# Patient Record
Sex: FEMALE | Race: BLACK OR AFRICAN AMERICAN | NOT HISPANIC OR LATINO
[De-identification: names, ages, dates, MRNs, and addresses within clinical notes are randomized per-mention and may not be internally consistent; named-entity substitution may affect disease eponyms.]

---

## 2017-01-13 ENCOUNTER — APPOINTMENT (OUTPATIENT)
Dept: OBGYN | Facility: CLINIC | Age: 52
End: 2017-01-13

## 2017-07-07 ENCOUNTER — APPOINTMENT (OUTPATIENT)
Dept: OBGYN | Facility: CLINIC | Age: 52
End: 2017-07-07

## 2017-07-07 ENCOUNTER — OUTPATIENT (OUTPATIENT)
Dept: OUTPATIENT SERVICES | Facility: HOSPITAL | Age: 52
LOS: 1 days | Discharge: HOME | End: 2017-07-07

## 2017-07-07 ENCOUNTER — RESULT REVIEW (OUTPATIENT)
Age: 52
End: 2017-07-07

## 2017-07-07 VITALS
SYSTOLIC BLOOD PRESSURE: 120 MMHG | DIASTOLIC BLOOD PRESSURE: 70 MMHG | WEIGHT: 194 LBS | BODY MASS INDEX: 32.32 KG/M2 | HEIGHT: 65 IN

## 2017-07-10 ENCOUNTER — RESULT REVIEW (OUTPATIENT)
Age: 52
End: 2017-07-10

## 2017-07-11 LAB
BASOPHILS # BLD: 0.02 TH/MM3
BASOPHILS NFR BLD: 0.2 %
DIFFERENTIAL METHOD BLD: NORMAL
EOSINOPHIL # BLD: 0.05 TH/MM3
EOSINOPHIL NFR BLD: 0.6 %
ERYTHROCYTE [DISTWIDTH] IN BLOOD BY AUTOMATED COUNT: 14.7 %
ESTRADIOL FREE SERPL-MCNC: 7 PG/ML
FSH SERPL-MCNC: 38.1 IU/L
GRANULOCYTES # BLD: 4.21 TH/MM3
GRANULOCYTES NFR BLD: 47.1 %
HCT VFR BLD AUTO: 41.1 %
HGB BLD-MCNC: 12.9 G/DL
HIV1+2 AB SPEC QL IA.RAPID: NONREACTIVE
IMM GRANULOCYTES # BLD: 0.01 TH/MM3
IMM GRANULOCYTES NFR BLD: 0.1 %
LYMPHOCYTES # BLD: 4.03 TH/MM3
LYMPHOCYTES NFR BLD: 45.1 %
MCH RBC QN AUTO: 27 PG
MCHC RBC AUTO-ENTMCNC: 31.4 G/DL
MCV RBC AUTO: 86.2 FL
MONOCYTES # BLD: 0.62 TH/MM3
MONOCYTES NFR BLD: 6.9 %
PLATELET # BLD: 296 TH/MM3
PMV BLD AUTO: 11.4 FL
PROLACTIN SERPL-MCNC: 8.2 NG/ML
RBC # BLD AUTO: 4.77 MIL/MM3
T4 FREE SERPL-MCNC: 1.3 NG/DL
TSH SERPL DL<=0.005 MIU/L-ACNC: 1.12 UIU/ML
WBC # BLD: 8.94 TH/MM3

## 2017-07-14 ENCOUNTER — APPOINTMENT (OUTPATIENT)
Dept: OBGYN | Facility: CLINIC | Age: 52
End: 2017-07-14

## 2017-07-14 DIAGNOSIS — N95.0 POSTMENOPAUSAL BLEEDING: ICD-10-CM

## 2017-07-14 LAB — HPV I/H RISK 1 DNA CVX QL PROBE+SIG AMP: NOT DETECTED

## 2017-07-18 ENCOUNTER — APPOINTMENT (OUTPATIENT)
Dept: ANTEPARTUM | Facility: CLINIC | Age: 52
End: 2017-07-18

## 2017-07-19 DIAGNOSIS — D25.9 LEIOMYOMA OF UTERUS, UNSPECIFIED: ICD-10-CM

## 2017-07-19 DIAGNOSIS — N83.209 UNSPECIFIED OVARIAN CYST, UNSPECIFIED SIDE: ICD-10-CM

## 2017-07-21 ENCOUNTER — OUTPATIENT (OUTPATIENT)
Dept: OUTPATIENT SERVICES | Facility: HOSPITAL | Age: 52
LOS: 1 days | Discharge: HOME | End: 2017-07-21

## 2017-07-21 ENCOUNTER — APPOINTMENT (OUTPATIENT)
Dept: OBGYN | Facility: CLINIC | Age: 52
End: 2017-07-21

## 2017-07-21 VITALS
BODY MASS INDEX: 32.32 KG/M2 | DIASTOLIC BLOOD PRESSURE: 60 MMHG | SYSTOLIC BLOOD PRESSURE: 120 MMHG | HEIGHT: 65 IN | WEIGHT: 194 LBS

## 2017-07-28 DIAGNOSIS — N95.0 POSTMENOPAUSAL BLEEDING: ICD-10-CM

## 2017-07-29 LAB
C TRACH RRNA SPEC QL NAA+PROBE: NOT DETECTED
HBV SURFACE AG SER-ACNC: NONREACTIVE
HCV AB S/CO SERPL IA: 0.12 S/CO
HCV AB SER QL: NONREACTIVE
N GONORRHOEA RRNA SPEC QL NAA+PROBE: NOT DETECTED
T PALLIDUM AB SER QL: NEGATIVE
T PALLIDUM AB SER-ACNC: <0.1 INDEX

## 2018-01-10 ENCOUNTER — APPOINTMENT (OUTPATIENT)
Dept: ANTEPARTUM | Facility: CLINIC | Age: 53
End: 2018-01-10

## 2018-10-02 ENCOUNTER — TRANSCRIPTION ENCOUNTER (OUTPATIENT)
Age: 53
End: 2018-10-02

## 2018-11-26 ENCOUNTER — OUTPATIENT (OUTPATIENT)
Dept: OUTPATIENT SERVICES | Facility: HOSPITAL | Age: 53
LOS: 1 days | Discharge: HOME | End: 2018-11-26

## 2018-11-26 ENCOUNTER — APPOINTMENT (OUTPATIENT)
Dept: OBGYN | Facility: CLINIC | Age: 53
End: 2018-11-26

## 2018-11-26 VITALS
DIASTOLIC BLOOD PRESSURE: 82 MMHG | SYSTOLIC BLOOD PRESSURE: 118 MMHG | BODY MASS INDEX: 35.99 KG/M2 | HEIGHT: 65 IN | WEIGHT: 216 LBS

## 2018-11-26 DIAGNOSIS — Z87.42 PERSONAL HISTORY OF OTHER DISEASES OF THE FEMALE GENITAL TRACT: ICD-10-CM

## 2018-11-26 DIAGNOSIS — N64.4 MASTODYNIA: ICD-10-CM

## 2018-11-26 DIAGNOSIS — Z01.419 ENCOUNTER FOR GYNECOLOGICAL EXAMINATION (GENERAL) (ROUTINE) WITHOUT ABNORMAL FINDINGS: ICD-10-CM

## 2018-12-03 LAB
C TRACH RRNA SPEC QL NAA+PROBE: NOT DETECTED
N GONORRHOEA RRNA SPEC QL NAA+PROBE: NOT DETECTED
SOURCE AMPLIFICATION: NORMAL

## 2018-12-17 ENCOUNTER — APPOINTMENT (OUTPATIENT)
Dept: ANTEPARTUM | Facility: CLINIC | Age: 53
End: 2018-12-17

## 2020-02-20 ENCOUNTER — OUTPATIENT (OUTPATIENT)
Dept: OUTPATIENT SERVICES | Facility: HOSPITAL | Age: 55
LOS: 1 days | Discharge: HOME | End: 2020-02-20
Payer: COMMERCIAL

## 2020-02-20 DIAGNOSIS — R92.8 OTHER ABNORMAL AND INCONCLUSIVE FINDINGS ON DIAGNOSTIC IMAGING OF BREAST: ICD-10-CM

## 2020-02-20 PROCEDURE — G0279: CPT | Mod: 26

## 2020-02-20 PROCEDURE — 77066 DX MAMMO INCL CAD BI: CPT | Mod: 26

## 2020-02-20 PROCEDURE — 76642 ULTRASOUND BREAST LIMITED: CPT | Mod: 26,50

## 2020-11-23 ENCOUNTER — OUTPATIENT (OUTPATIENT)
Dept: OUTPATIENT SERVICES | Facility: HOSPITAL | Age: 55
LOS: 1 days | Discharge: HOME | End: 2020-11-23

## 2020-11-23 ENCOUNTER — APPOINTMENT (OUTPATIENT)
Dept: OBGYN | Facility: CLINIC | Age: 55
End: 2020-11-23
Payer: COMMERCIAL

## 2020-11-23 VITALS — SYSTOLIC BLOOD PRESSURE: 110 MMHG | BODY MASS INDEX: 35.94 KG/M2 | DIASTOLIC BLOOD PRESSURE: 80 MMHG | WEIGHT: 216 LBS

## 2020-11-23 DIAGNOSIS — Z01.419 ENCOUNTER FOR GYNECOLOGICAL EXAMINATION (GENERAL) (ROUTINE) WITHOUT ABNORMAL FINDINGS: ICD-10-CM

## 2020-11-23 DIAGNOSIS — D17.23 BENIGN LIPOMATOUS NEOPLASM OF SKIN AND SUBCUTANEOUS TISSUE OF RIGHT LEG: ICD-10-CM

## 2020-11-23 PROCEDURE — 99396 PREV VISIT EST AGE 40-64: CPT

## 2020-11-23 RX ORDER — METRONIDAZOLE 7.5 MG/G
0.75 GEL VAGINAL
Qty: 1 | Refills: 5 | Status: ACTIVE | COMMUNITY
Start: 2020-11-23 | End: 1900-01-01

## 2020-11-23 NOTE — HISTORY OF PRESENT ILLNESS
[Patient reported mammogram was normal] : Patient reported mammogram was normal [Patient reported PAP Smear was normal] : Patient reported PAP Smear was normal [TextBox_4] : Patient here for annual. C/O whitish discharge from vagina. She is also requesting STD testing as she has recently become sexually active again. Denies PMB. No period for 2 years. [Mammogramdate] : 2018 [PapSmeardate] : 2017

## 2020-11-23 NOTE — PHYSICAL EXAM
[Appropriately responsive] : appropriately responsive [Alert] : alert [No Acute Distress] : no acute distress [No Lymphadenopathy] : no lymphadenopathy [No Murmurs] : no murmurs [Soft] : soft [Non-tender] : non-tender [Non-distended] : non-distended [No HSM] : No HSM [No Lesions] : no lesions [No Mass] : no mass [Oriented x3] : oriented x3 [Examination Of The Breasts] : a normal appearance [No Masses] : no breast masses were palpable [Labia Majora] : normal [Labia Minora] : normal [Discharge] : a  ~M vaginal discharge was present [Normal] : normal [Enlarged ___ wks] : enlarged [unfilled] ~Uweeks [Uterine Adnexae] : normal

## 2020-11-29 LAB
A VAGINAE DNA VAG QL NAA+PROBE: NORMAL
BVAB2 DNA VAG QL NAA+PROBE: NORMAL
C KRUSEI DNA VAG QL NAA+PROBE: NEGATIVE
C TRACH RRNA SPEC QL NAA+PROBE: NEGATIVE
HPV HIGH+LOW RISK DNA PNL CVX: NOT DETECTED
MEGA1 DNA VAG QL NAA+PROBE: NORMAL
N GONORRHOEA RRNA SPEC QL NAA+PROBE: NEGATIVE
T VAGINALIS RRNA SPEC QL NAA+PROBE: NEGATIVE

## 2020-12-09 ENCOUNTER — APPOINTMENT (OUTPATIENT)
Dept: SURGERY | Facility: CLINIC | Age: 55
End: 2020-12-09
Payer: COMMERCIAL

## 2020-12-09 VITALS
SYSTOLIC BLOOD PRESSURE: 120 MMHG | HEART RATE: 81 BPM | HEIGHT: 65 IN | DIASTOLIC BLOOD PRESSURE: 80 MMHG | WEIGHT: 217 LBS | TEMPERATURE: 96 F | BODY MASS INDEX: 36.15 KG/M2

## 2020-12-09 PROCEDURE — 99203 OFFICE O/P NEW LOW 30 MIN: CPT

## 2020-12-09 PROCEDURE — 99072 ADDL SUPL MATRL&STAF TM PHE: CPT

## 2020-12-10 NOTE — PHYSICAL EXAM
[Normal Breath Sounds] : Normal breath sounds [Normal Heart Sounds] : normal heart sounds [Alert] : alert [Calm] : calm [de-identified] : no acute distress [de-identified] : full ROM x 4 [de-identified] : right medial thigh fatty fullness without discernible mass

## 2020-12-10 NOTE — HISTORY OF PRESENT ILLNESS
[de-identified] : 56yo female with PMHx/PSHx of abdominoplasty and liposuction presenting for evaluation of right medial thigh mass. Patient states that she noted lesion in the past year. She denies change in size, drainage, or neuropathy. Patient does not notice other lesions.

## 2020-12-10 NOTE — ASSESSMENT
[FreeTextEntry1] : 54yo female with right medial thigh fatty overgrowth, possible lipoma.\par -informed patient that there is not a clear mass, but lipoplasty is possible\par -patient would like to wait until March 2021 for intervention\par -will monitor region \par -call when read to prepare for surgery\par -will recommend surgery in [place] \par -will waive PAST

## 2021-06-01 ENCOUNTER — OUTPATIENT (OUTPATIENT)
Dept: OUTPATIENT SERVICES | Facility: HOSPITAL | Age: 56
LOS: 1 days | Discharge: HOME | End: 2021-06-01
Payer: COMMERCIAL

## 2021-06-01 ENCOUNTER — TRANSCRIPTION ENCOUNTER (OUTPATIENT)
Age: 56
End: 2021-06-01

## 2021-06-01 DIAGNOSIS — R07.89 OTHER CHEST PAIN: ICD-10-CM

## 2021-06-01 PROCEDURE — 71046 X-RAY EXAM CHEST 2 VIEWS: CPT | Mod: 26

## 2021-06-07 ENCOUNTER — APPOINTMENT (OUTPATIENT)
Dept: OBGYN | Facility: CLINIC | Age: 56
End: 2021-06-07
Payer: COMMERCIAL

## 2021-06-07 ENCOUNTER — OUTPATIENT (OUTPATIENT)
Dept: OUTPATIENT SERVICES | Facility: HOSPITAL | Age: 56
LOS: 1 days | Discharge: HOME | End: 2021-06-07

## 2021-06-07 VITALS — DIASTOLIC BLOOD PRESSURE: 78 MMHG | WEIGHT: 224 LBS | SYSTOLIC BLOOD PRESSURE: 122 MMHG | BODY MASS INDEX: 37.28 KG/M2

## 2021-06-07 DIAGNOSIS — M79.89 OTHER SPECIFIED SOFT TISSUE DISORDERS: ICD-10-CM

## 2021-06-07 DIAGNOSIS — R20.2 PARESTHESIA OF SKIN: ICD-10-CM

## 2021-06-07 PROCEDURE — 99214 OFFICE O/P EST MOD 30 MIN: CPT

## 2021-06-07 NOTE — PLAN
[FreeTextEntry1] : Left arm and axillary symptoms and findings are probably due to a combination of:\par 1) Spinal disease\par 2) Trauma from a fall\par 3) Possible Lipoma of the axilla\par 4) Possible side effect from Covid vaccination.\par \par I recommend that she take a short course of steroids as was recommended by urgent care.\par appointment with Neurology\par Appt with breast surgeon and breast/axillary sonogram\par patient reassured that the finding in the axilla is unlikely to be anything worrisome or cancerous. She may need to see a general surgeon following the breast surgeon.\par

## 2021-06-07 NOTE — PHYSICAL EXAM
[Soft] : soft [Non-tender] : non-tender [Non-distended] : non-distended [No HSM] : No HSM [No Lesions] : no lesions [No Mass] : no mass [FreeTextEntry2] : Left extremity (especially hand) is slightly more swollen than the right. [FreeTextEntry6] : The right axilla is swollen with no discrete lymph nodes palpitates. There seems to be a possible lipoma present. [FreeTextEntry8] : Both upper extremity reflex are normal. [Examination Of The Breasts] : a normal appearance [No Masses] : no breast masses were palpable [Labia Majora] : normal [Labia Minora] : normal [Normal] : normal [Uterine Adnexae] : normal

## 2021-06-07 NOTE — HISTORY OF PRESENT ILLNESS
[FreeTextEntry1] : Patient c/o left arm pain radiating up to the left axilla for the past several weeks.\par She received the Moderna vaccine in March. The second dose was April 17th.\par She also admits to falling in March and hurting her left arm.\par She went to an urgent care center and was told that the has lymph nodes in her left axilla and to get an immediate breast exam.\par PAtient also report some numbness and paraesthesias in her left hand and feels that her left hand is a bit more swollen than her right hand.\par \par \par \par

## 2021-06-15 ENCOUNTER — RESULT REVIEW (OUTPATIENT)
Age: 56
End: 2021-06-15

## 2021-06-15 ENCOUNTER — OUTPATIENT (OUTPATIENT)
Dept: OUTPATIENT SERVICES | Facility: HOSPITAL | Age: 56
LOS: 1 days | Discharge: HOME | End: 2021-06-15
Payer: COMMERCIAL

## 2021-06-15 DIAGNOSIS — N64.59 OTHER SIGNS AND SYMPTOMS IN BREAST: ICD-10-CM

## 2021-06-15 PROCEDURE — 77066 DX MAMMO INCL CAD BI: CPT | Mod: 26

## 2021-06-15 PROCEDURE — 76642 ULTRASOUND BREAST LIMITED: CPT | Mod: 26,LT

## 2021-06-15 PROCEDURE — G0279: CPT | Mod: 26

## 2021-10-27 NOTE — HISTORY OF PRESENT ILLNESS
[FreeTextEntry1] : Tanya is 56 year old female here with complaints of LEFT breast palpable mass\par \par \par Her imaging is as follows:\par  06/15/2021 b/l dx mammo and LEFT US\par -breasts are heterogeneously dense\par No mammographic or sonographic evidence of malignancy Deemed BIRADS2\par \par \par HISTORICAL RISK FACTORS\par -no previous sx or breast bx \par -fam hx of breast cancer in maternal aunt \par - age at first born\par -no gyn surgery \par \par \par

## 2021-10-27 NOTE — ASSESSMENT
[FreeTextEntry1] : Tanya is 56 year old female here with complaints of LEFT breast palpable mass\par \par \par Her imaging is as follows:\par  06/15/2021 b/l dx mammo and LEFT US\par -breasts are heterogeneously dense\par No mammographic or sonographic evidence of malignancy Deemed BIRADS2\par

## 2021-10-27 NOTE — DATA REVIEWED
[FreeTextEntry1] : EXAM:  MG US BREAST LIMITED LT\par EXAM:  MG MAMMO DIAG W CARTER BI#\par \par \par PROCEDURE DATE:  06/15/2021\par \par \par \par INTERPRETATION:  Clinical History / Reason for exam: Palpable concern left axilla. Screening right breast.\par \par The patient reports her last clinical breast examination was performed about one month ago.\par \par Family history of breast cancer: maternal aunt, at age 60, breast cancer.\par \par Comparisons: Mammograms dating back to 2018.\par \par Views obtained: bilateral full field digital 2D and digital tomosynthesis images as well as spot views.\par \par Computer-aided detection was utilized in the interpretation of this examination.\par \par Breast composition: The breasts are heterogeneously dense, which may obscure small masses.\par \par Findings:\par \par Mammogram:\par \par No suspicious mass, microcalcifications or areas of architectural distortion seen in either breast.\par \par Ultrasound:\par \par Targeted unilateral left breast ultrasound was performed.\par \par No suspicious solid or cystic mass at area of palpable concern in the left axilla.\par \par Impression: No mammographic or sonographic evidence of malignancy.\par \par Recommendation: Any decision to biopsy the palpable abnormality should be based on the level of clinical concern.\par \par BI-RADS Category 2: Benign\par

## 2021-10-28 ENCOUNTER — APPOINTMENT (OUTPATIENT)
Dept: BREAST CENTER | Facility: CLINIC | Age: 56
End: 2021-10-28

## 2022-07-07 ENCOUNTER — APPOINTMENT (OUTPATIENT)
Dept: PAIN MANAGEMENT | Facility: CLINIC | Age: 57
End: 2022-07-07

## 2022-07-07 VITALS
BODY MASS INDEX: 37.32 KG/M2 | DIASTOLIC BLOOD PRESSURE: 83 MMHG | WEIGHT: 224 LBS | HEIGHT: 65 IN | HEART RATE: 76 BPM | SYSTOLIC BLOOD PRESSURE: 119 MMHG

## 2022-07-07 DIAGNOSIS — M54.12 RADICULOPATHY, CERVICAL REGION: ICD-10-CM

## 2022-07-07 PROCEDURE — 99213 OFFICE O/P EST LOW 20 MIN: CPT

## 2022-07-08 NOTE — PHYSICAL EXAM
[de-identified] : GENERAL APPEARANCE OF PATIENT IS WELL DEVELOPED, WELL NOURISHED, BODY HABITUS NORMAL, WELL GROOMED, NO DEFORMITIES NOTED. \par Head - Atraumatic and Normocephalic \par Eyes, Nose, and Throat: External inspection of ears and nose are normal overall without scars, lesions, or masses noted. Assessment of hearing is normal\par Neck-Examination of neck shows no masses, overall appearance is normal, neck is symmetric, tracheal position is midline, no crepitus is noted. Examination of thyroid shows no enlargement, tenderness or masses\par Respiratory- Assessment of respiratory effort shows no intercostal retractions, no use of accessory muscles, unlabored breathing, and normal diaphragmatic movement.\par Cardiovascular- Examination of extremities show no edema or varicosities\par Musculoskeletal. Examination of gait is not antalgic and station is normal\par Inspection and palpation of digits and nails shows no clubbing, cyanosis, nodules, drainage, fluctuance, petechiae\par \par • Spine – right facet pain at L3-S1.  degrees in flexion. 5-10 degrees in extension with pain. Left PSIs tenderness and left Positive Salbador’s. \par \par \par • Neck- inspection and palpation shows no misalignment, asymmetry, crepitation, defects, tenderness, masses, effusions. ROM is normal without crepitation or contracture. No instability or subluxation or laxity is noted. No abnormal movements.\par \par \par • RUE- inspection and palpation shows no misalignment, asymmetry, crepitation, defects, tenderness, masses, effusions. ROM is normal without crepitation or contracture. No instability or subluxation or laxity is noted. No abnormal movements.\par \par \par • LUE- inspection and palpation shows no misalignment, asymmetry, crepitation, defects, tenderness, masses, effusions. ROM is normal without crepitation or contracture. No instability or subluxation or laxity is noted. No abnormal movements.\par \par \par • RLE- inspection and palpation shows no misalignment, asymmetry, crepitation, defects, tenderness, masses, effusions. ROM is normal without crepitation or contracture. No instability or subluxation or laxity is noted. No abnormal movements.\par \par \par • LLE- inspection and palpation shows no misalignment, asymmetry, crepitation, defects, tenderness, masses, effusions. ROM is normal without crepitation or contracture. No instability or subluxation or laxity is noted. No abnormal movements.\par \par \par • Skin- Inspection of skin and subcutaneous tissue shows no rashes, lesions or ulcers. Palpation of skin and subcutaneous tissue shows no rashes, no indurations, subcutaneous nodules or tightening.\par \par \par • Abdomen- Nontender\par \par \par • Neurologic- CN 2-12 are grossly intact. No sensory or motor deficits in the upper and lower extremities. Adequate strength in upper and lower extremities \par \par \par • Psychiatric- Patient’s judgment and insight are intact. Oriented to time, place and person. Recent and remote memory intact.

## 2022-07-08 NOTE — ASSESSMENT
[FreeTextEntry1] : Patient is a 57 year old woman who presents today status post Left L3-S1 medial branch block w mac done on 6/2/22 which provided 100% relief for 2 hours. At this time, we will proceed with a left L3-S1 RFA w MAC. \par \par All of this patient's questions were answered and the conversation was understood well.\par \par \par The patient has severe anxiety of procedures that necessitates monitored anesthesia care (MAC). The procedure performed will be close to major nerves, arteries, and spinal cord and/or joint structures. Due to the proximity of these structures, we need the patient to be still during the procedure. With the help of MAC, this will be safely achieved and decrease the risk of any complications.\par \par Risk, benefits, pros and cons of procedure were explained to the patient using models and diagrams and their questions were answered.\par \par We encourage a home exercise program, stressing walking and strengthening of the core. We have recommended exercises such as the modified plank, Lucina exercise, elliptical , recumbent bike, as well as shoulder griddle strengthening. We discussed recreational activities such as swimming, Tal Chi and yoga. Use things that heat like hot shower or icy heat before rehab and exercising and at the beginning of the day, and ice (ice in a bag never directly on the skin) after activity and at the end of the day.\par \par Jaime Singh Scribe: Dr. Orozco *\par I, Jaime Singh, attest that this documentation has been prepared under the direction and in the presence of Provider Glen Orozco DO\par \par Pam Scribe Attestation.\par "The documentation recorded by the scribe, in my presence, accurately reflects the service I personally performed, and the decisions made by me with my edits as appropriate." \par \par Best Regards, \par REGINE Rosa.[email or web address]. \par Diplomat, American Board of Anesthesiology \par Diplomat, American Board of Pain Medicine \par Diplomat, American Board of Pain Management \par \par \par

## 2022-07-08 NOTE — HISTORY OF PRESENT ILLNESS
[FreeTextEntry1] : HISTORY OF PRESENT ILLNESS: This is a 57 year old woman complaining of left hip, lower back and bilateral lower extremity pain. The patient has had this pain for 8 years. The pain has worsened in the last 3 days. The pain started after no specific injury or event. Patient describes pain as severe rating 10/10 on the pain scale. During the last month the pain has been constant with symptoms worsening in no typical pattern. Pain described as burning, sharp and shooting. Pain is associated with numbness and pins and needles into the lower extremities. Patient has weakness in the lower extremities. Pain is increased with standing, sitting and walking. Pain is decreased with lying down relaxation. Pain is not changed with coughing/sneezing in bowel movements. Bowel or bladder habits have not changed.\par \par \par ACTIVITIES: Patient could walk 1 block before the pain starts. Patient can sit 6 hours before pain starts. Patient can stand 1 hour before pain starts. The patient often lays down because of pain. Patient uses no assistive walking device at this time. Patient has difficulty participating in recreational activities and exercising at this time.\par \par PRIOR PAIN TREATMENTS: No relief with physical therapy, exercise, Tens, heat treatment and cold treatment and chiropractic manipulation\par \par PRIOR PAIN MEDICATIONS: Tramadol\par \par \par TODAY: PATIENT PRESENTS FOR A REVISIT ENCOUNTER. She is status post left L3-S1 MBB on 6/2/22 which provided her 100% relief for 2 hours. At this time, patient has returned and severe.

## 2022-07-26 ENCOUNTER — APPOINTMENT (OUTPATIENT)
Dept: PAIN MANAGEMENT | Facility: CLINIC | Age: 57
End: 2022-07-26

## 2022-07-26 PROCEDURE — 64635 DESTROY LUMB/SAC FACET JNT: CPT | Mod: LT

## 2022-07-26 PROCEDURE — 93770 DETERMINATION VENOUS PRESS: CPT | Mod: 59

## 2022-07-26 PROCEDURE — 93040 RHYTHM ECG WITH REPORT: CPT | Mod: 59

## 2022-07-26 PROCEDURE — 94761 N-INVAS EAR/PLS OXIMETRY MLT: CPT

## 2022-07-26 PROCEDURE — 64636Z: CUSTOM | Mod: LT,59

## 2022-07-26 NOTE — PROCEDURE
[FreeTextEntry1] : LUMBAR RFA WITH MAC L3-S1  [FreeTextEntry3] : Medial Branch Nerve Rhizotomy- Lumbar UNDER FLUORSCOPY\par \par  \par \par Date:  07/26/2022\par \par Patient: BRIE MARTINS\par \par \par Preoperative Diagnosis: Lumbar facet arthropathy left L3- S1\par \par Postoperative Diagnosis: Same\par \par Procedure: Neurotomy of the medial branch nerve of left L3-S1 under\par  fluoroscopy. \par Physician: Wong Orozco D.O.\par \par \par \par \par Anesthesiologist/CRNA:  Ms. Walker\par \par Anesthesia: See Nurses note. MAC/Lidocaine/Cold spray\par \par \par \par \par Medical Necessity: Failure of conservative medical management. Facet arthropathy; intractable axial pain amenable only to medial branch nerve injections. Criteria met for a medial branch nerve neurotomy.\par \par \par \par \par Indication for Fluoroscopy:  This procedure requires the precise placement of the spinal needle into the epidural space.  It is the only way to accurately and safely perform the injection.\par \par \par \par \par CONSENT: The possible complications including infection, bleeding, nerve damage, hospital admission, death or failure of the procedure; though unusual, are theoretically possible. The patient was educated about the of the procedure and alternative therapies. All questions were answered and the patient freely gave consent to proceed. The patient was also told that sometimes patients will notice lower extremity weakness immediately following the procedure due to extravasation of local anesthetic solution onto the main nerve root during the procedure. In addition, the patient was informed that 1 to 2 weeks of perioperative discomfort following the procedure is to be expected.\par \par \par \par \par Monitoring:  Patient had continuous blood pressure, EKG, and pulse oximetry throughout the case. See nurse's notes.\par \par  \par \par PROCEDURE NOTE: \par \par After obtaining written consent, the patient was placed in a prone position on a fluoroscopy table.  The back was prepped and draped in a sterile fashion and a C-arm fluoroscopic device was used for localization of the radiofrequency target sites. A time out was performed. The  mm disposable Gregory*-coated cannula with a 5 mm active tip was adjusted via the sizing collar so that the electrode fit just inside the inner bevel at the end of the bare metal. Prior to beginning the procedure, the internal test mode function of the radiofrequency unit was checked to make sure the machine was functioning properly. The initial target zones included the junction of the transverse process and the pedicle at each level. \par \par  \par \par At each target site, Cold spray was used to localize the area followed by 2% Lidocaine solution was used to anesthetize the skin and subcutaneous tissues and the radiofrequency cannula were placed in a parallel fashion to the x-ray beam and directed in a bulls-eye fashion down to the target zones. The cannula was then walked over the leading edge and advanced approximately 2 to 3 mm in an anterior direction. If patient gets sustained relief will have patient do modified planks 3 times a day on carpet or yoga mat starting at 5 seconds building up to 1 minute without grimacing/Valsalva and walking.\par \par  \par \par The second target zones included the superior and medial junction of the sacral ala. At each target site, a 2% Lidocaine solution was used to anesthetize the skin and subcutaneous tissues. Again, the radiofrequency cannulas were placed in a parallel fashion to the x-ray beam and directed in a bulls-eye fashion down to the target zones. The cannula was then walked over the leading edge and advanced approximately 2 to 3 mm in an anterior direction. A lateral view of the spine was performed to insure that the cannulas were not too far to the opening of the foraminal canals. Sensory stimulation at 50 hertz was performed at each target area. Referral of the sensory stimulation was noted at less than 1 volt over the paravertebral area or hip. Motor dissociation at 2 hertz was obtained by stimulating up to 3 times the voltage of the sensory stimulation and insuring a lack of motor movement in the lower extremities. Once the radiofrequency cannula was in correct position, a 2% Lidocaine with 10mg of depomedrol solution was used to rapidly anesthetize the lesion site. After a thirty second delay, thermal lesions were then created at each level for 90 seconds at a temperature of 80° C. After the lesions were created, the cannulas were removed intact and sterile bandages placed at the puncture sites.. \par \par  \par \par Complications: None. The patient tolerated the procedure well. \par \par  \par \par Disposition: I have examined the patient and there are no new physical findings since the original presentation.  Sensory and motor function were intact. The patient met discharge criteria see nurses notes. The discharge instruction sheet was reviewed and given to the patient. The patient was discharged home with a . If patient gets sustained relief will have patient do modified planks 3 times a day on carpet or yoga mat starting at 5 seconds building up to 1 minute without grimacing/Valsalva and walking.\par \par  \par \par Comment: Dx MBI with  75% immediate relief for greater than 120 minutes. RFA today, follow up in 2-4 weeks. Call if in problem.\par \par  \par \par Indication for RFA: The pt had a positive response to medial branch diagnostic injections and is considered a good candidate for the thermal RF ablation procedure. The diagnostic injection provided at least 50% reduction in pain for the duration of the local anesthetic.  \par The following criteria have been met: 1) failed response to at least 3 months of conservative management; 2) patient has LBP that is non-radicular, suggesting facet joint origin supported by absence of nerve root compression documented on the medical record on H&P and radiographic evaluation; 3) minimum of 6 months has elapsed since any prior RF treatments. If prior ablation therapy has been performed, it provided at least 50% relief for minimum of 10-12 weeks; 4) no prior spinal fusion at the vertebral level being treated.\par \par  \par \par This document was electronically signed by:\par \par Wong Orozco D.O.\par \par  Diplomat, American Board of Anesthesiology\par \par  Diplomat, American Board of Pain Medicine\par \par  Diplomat, American Board of Pain Management\par \par \par \par \par \par \par \par \par \par

## 2022-08-01 ENCOUNTER — APPOINTMENT (OUTPATIENT)
Dept: ORTHOPEDIC SURGERY | Facility: CLINIC | Age: 57
End: 2022-08-01

## 2022-08-01 DIAGNOSIS — S33.5XXD SPRAIN OF LIGAMENTS OF LUMBAR SPINE, SUBSEQUENT ENCOUNTER: ICD-10-CM

## 2022-08-01 DIAGNOSIS — S43.401D UNSPECIFIED SPRAIN OF RIGHT SHOULDER JOINT, SUBSEQUENT ENCOUNTER: ICD-10-CM

## 2022-08-01 PROCEDURE — 99213 OFFICE O/P EST LOW 20 MIN: CPT

## 2022-08-01 PROCEDURE — 99072 ADDL SUPL MATRL&STAF TM PHE: CPT

## 2022-08-01 NOTE — HISTORY OF PRESENT ILLNESS
[de-identified] : \par 57-year-old female comes in today for follow-up evaluation of her left arm and left knee pain from an injury that\par occurred on April 24, 2021. Patient had an injury that occurred at work. patient works for [employer]par Patient slipped on the stairs. Of note patient did have covid vaccine left arm in April, she saw her doctor since she was\par having some swelling in the axilla region which they believe may have been some swollen lymph nodes. She had a chest\par x-ray otherwise no imaging done of her arm. Patient has been working. Patient is right-hand dominant. Patient has an\par allergy to NSAIDs so she takes Tylenol. She has been working using a TENS unit ordered herself no problems in the arm\par prior just started therapy 4 visits then was stopped feeling some weakness hip flexion ankle dorsiflexion \par History cervical spine surgery 2014 did get MRI of the left knee 11/25/2021\par did get MRI left shoulder 02/05/2022 there was some death in the family did not get therapy yet describe asciatic\par complaint left more than right still working

## 2022-08-01 NOTE — HISTORY OF PRESENT ILLNESS
[de-identified] :  57 years of age initially hurt in a work accident 04/10/2014 she was in a chair getting out of the chair it broke she fell injuring her neck back right shoulder right knee right ankle and right hip she  was complaining at the time of left hip pain which was eventually  was excepted as part of the work accident she has been complaining lately of pain in her back saw pain management doctor Pam juarez and RFA procedure few weeks ago which helped greatly is still having pain in the back of the left hip\par  she had seen Dr. Alejandre 06/04/2020 who found mild degenerative changes in the hip at that  time suggested therapy and found some knee arthritis mild moderate

## 2022-08-01 NOTE — ASSESSMENT
[FreeTextEntry1] :  at this time of like to reopen the case with [facility] 27 so we can get additional diagnostics therapy and possibly an injection for the left hip she continues to work she has a mild to moderate partial temporary disability I will see her back in a few months  some the symptoms may relate to the SI joint

## 2022-08-01 NOTE — IMAGING
[de-identified] : Left shoulder mild impingement good motion good strength positive Lucio sign\par  left elbow full motion no pain or tenderness good strength\par  left knee tenderness on the joint medial and laterally some crepitus swelling limits in flexion antalgic gait

## 2022-08-01 NOTE — ASSESSMENT
[FreeTextEntry1] : she continues to work might consider injections to the  knee at the next visit also discussed\par arthroscopic surgery risks and benefits She has a moderate partial temporary disability new prescription for\par therapy provided I will see her in a couple months

## 2022-08-01 NOTE — IMAGING
[de-identified] :  mild limits in motion in the neck with spasm healed incision some spasm in the back limits in flexion extension right hip has good motion left hip some restriction and some weakness in hip flexion tenderness over the greater trochanter in the groin and posteriorly in the buttock region right knee and right ankle have good motion  she does have mild pain referred to the left  SI joint with bilateral manual compression of the pelvis

## 2022-08-01 NOTE — IMAGING
[de-identified] : Left shoulder mild impingement good motion good strength positive Lucio sign\par  left elbow full motion no pain or tenderness good strength\par  left knee tenderness on the joint medial and laterally some crepitus swelling limits in flexion antalgic gait

## 2022-08-01 NOTE — HISTORY OF PRESENT ILLNESS
[de-identified] : \par 57-year-old female comes in today for follow-up evaluation of her left arm and left knee pain from an injury that\par occurred on April 24, 2021. Patient had an injury that occurred at work. patient works for [employer]par Patient slipped on the stairs. Of note patient did have covid vaccine left arm in April, she saw her doctor since she was\par having some swelling in the axilla region which they believe may have been some swollen lymph nodes. She had a chest\par x-ray otherwise no imaging done of her arm. Patient has been working. Patient is right-hand dominant. Patient has an\par allergy to NSAIDs so she takes Tylenol. She has been working using a TENS unit ordered herself no problems in the arm\par prior just started therapy 4 visits then was stopped feeling some weakness hip flexion ankle dorsiflexion \par History cervical spine surgery 2014 did get MRI of the left knee 11/25/2021\par did get MRI left shoulder 02/05/2022 there was some death in the family did not get therapy yet describe asciatic\par complaint left more than right still working

## 2022-08-30 ENCOUNTER — APPOINTMENT (OUTPATIENT)
Dept: PAIN MANAGEMENT | Facility: CLINIC | Age: 57
End: 2022-08-30

## 2022-08-30 VITALS — BODY MASS INDEX: 37.32 KG/M2 | WEIGHT: 224 LBS | HEIGHT: 65 IN

## 2022-08-30 PROCEDURE — 99213 OFFICE O/P EST LOW 20 MIN: CPT

## 2022-08-30 NOTE — PHYSICAL EXAM
[de-identified] : GENERAL APPEARANCE OF PATIENT IS WELL DEVELOPED, WELL NOURISHED, BODY HABITUS NORMAL, WELL GROOMED, NO DEFORMITIES NOTED. \par Head - Atraumatic and Normocephalic \par Eyes, Nose, and Throat: External inspection of ears and nose are normal overall without scars, lesions, or masses noted. Assessment of hearing is normal\par Neck-Examination of neck shows no masses, overall appearance is normal, neck is symmetric, tracheal position is midline, no crepitus is noted. Examination of thyroid shows no enlargement, tenderness or masses\par Respiratory- Assessment of respiratory effort shows no intercostal retractions, no use of accessory muscles, unlabored breathing, and normal diaphragmatic movement.\par Cardiovascular- Examination of extremities show no edema or varicosities\par Musculoskeletal. Examination of gait is not antalgic and station is normal\par Inspection and palpation of digits and nails shows no clubbing, cyanosis, nodules, drainage, fluctuance, petechiae\par \par • Spine –   degrees in flexion. 5-10 degrees in extension with pain. Left PSIs tenderness and left Positive Salbador’s. Left greater trochanter tenderness. \par \par \par • Neck- inspection and palpation shows no misalignment, asymmetry, crepitation, defects, tenderness, masses, effusions. ROM is normal without crepitation or contracture. No instability or subluxation or laxity is noted. No abnormal movements.\par \par \par • RUE- inspection and palpation shows no misalignment, asymmetry, crepitation, defects, tenderness, masses, effusions. ROM is normal without crepitation or contracture. No instability or subluxation or laxity is noted. No abnormal movements.\par \par \par • LUE- inspection and palpation shows no misalignment, asymmetry, crepitation, defects, tenderness, masses, effusions. ROM is normal without crepitation or contracture. No instability or subluxation or laxity is noted. No abnormal movements.\par \par \par • RLE- inspection and palpation shows no misalignment, asymmetry, crepitation, defects, tenderness, masses, effusions. ROM is normal without crepitation or contracture. No instability or subluxation or laxity is noted. No abnormal movements.\par \par \par • LLE- inspection and palpation shows no misalignment, asymmetry, crepitation, defects, tenderness, masses, effusions. ROM is normal without crepitation or contracture. No instability or subluxation or laxity is noted. No abnormal movements.\par \par \par • Skin- Inspection of skin and subcutaneous tissue shows no rashes, lesions or ulcers. Palpation of skin and subcutaneous tissue shows no rashes, no indurations, subcutaneous nodules or tightening.\par \par \par • Abdomen- Nontender\par \par \par • Neurologic- CN 2-12 are grossly intact. No sensory or motor deficits in the upper and lower extremities. Adequate strength in upper and lower extremities \par \par \par • Psychiatric- Patient’s judgment and insight are intact. Oriented to time, place and person. Recent and remote memory intact.

## 2022-08-30 NOTE — HISTORY OF PRESENT ILLNESS
[FreeTextEntry1] : HISTORY OF PRESENT ILLNESS: This is a 57 year old woman complaining of left hip, lower back and bilateral lower extremity pain. The patient has had this pain for 8 years. The pain has worsened in the last 3 days. The pain started after no specific injury or event. Patient describes pain as severe rating 10/10 on the pain scale. During the last month the pain has been constant with symptoms worsening in no typical pattern. Pain described as burning, sharp and shooting. Pain is associated with numbness and pins and needles into the lower extremities. Patient has weakness in the lower extremities. Pain is increased with standing, sitting and walking. Pain is decreased with lying down relaxation. Pain is not changed with coughing/sneezing in bowel movements. Bowel or bladder habits have not changed.\par \par TODAY: PATIENT PRESENTS FOR A REVISIT ENCOUNTER. She is status post left L3-S1 MBB on 6/2/22 which provided her 100% relief for 2 hours. We then proceeded with a left L3-S1 RFA w MAC on 7/26/22 which provided her 75% relief. \par \par Today, she presents with new onset of pain in the left SI joint with PSIs tenderness with radiation into the left hip and left lower extremity. Denies numbness or tingling in the LLE.

## 2022-08-30 NOTE — ASSESSMENT
[FreeTextEntry1] : Patient is a 57 year old woman who presents today status post left L3-S1 RFA w MAC with 75%. She currently complains of new onset of pain in the left SI Joint. Patient is presenting with mainly buttock pain with impairment in ADLs and functionality pointing to the left sacroiliac joint.  The pain has not responded to conservative care, including medications, stretching, as well as active modalities, such as physical therapy.  Imaging studies as well as physical exam findings corroborate the symptomatology and point to the sacroiliac joints.  We will proceed with diagnostic and therapeutic left sacroiliac joint injection. If this injection does not offer patient enough relief, we will proceed with a left greater trochanter injection. \par \par The patient has severe anxiety of procedures that necessitates monitored anesthesia care (MAC). The procedure performed will be close to major nerves, arteries, and spinal cord and/or joint structures. Due to the proximity of these structures, we need the patient to be still during the procedure. With the help of MAC, this will be safely achieved and decrease the risk of any complications.\par \par Risk, benefits, pros and cons of procedure were explained to the patient using models and diagrams and their questions were answered.\par \par We encourage a home exercise program, stressing walking and strengthening of the core. We have recommended exercises such as the modified plank, Lucina exercise, elliptical , recumbent bike, as well as shoulder griddle strengthening. We discussed recreational activities such as swimming, Tal Chi and yoga. Use things that heat like hot shower or icy heat before rehab and exercising and at the beginning of the day, and ice (ice in a bag never directly on the skin) after activity and at the end of the day.\par \par I, Kera Horner, attest that this documentation has been prepared under the direction and in the presence of Provider Glen Orozco DO\par \par The documentation recorded by the scribe, in my presence, accurately reflects the service I personally performed, and the decisions made by me with my edits as appropriate.\par \par \par Best Regards,  \par \par \par Wong Orozco, D.O.  \par \par Diplomat, American Board of Anesthesiology  \par \par Diplomat, American Board of Pain Medicine  \par \par Diplomat, American Board of Pain Management\par \par \par

## 2022-09-12 ENCOUNTER — APPOINTMENT (OUTPATIENT)
Dept: PAIN MANAGEMENT | Facility: CLINIC | Age: 57
End: 2022-09-12

## 2022-09-24 ENCOUNTER — APPOINTMENT (OUTPATIENT)
Dept: ORTHOPEDIC SURGERY | Facility: CLINIC | Age: 57
End: 2022-09-24

## 2022-09-24 PROCEDURE — 99213 OFFICE O/P EST LOW 20 MIN: CPT

## 2022-09-24 NOTE — REASON FOR VISIT
[FreeTextEntry2] : patient is a 57 year old female coming into the office today for left hip and knee \par  continues to see pain management has had some RFA procedure is helping is planning get an SI joint injection wearing a back brace reports pain in the back of the hip towards knee is severe

## 2022-09-24 NOTE — ASSESSMENT
[FreeTextEntry1] :  at this time i would like to reopen the case with [facility] 27 so we can get additional diagnostics therapy and possibly an injection for the left hip she continues to work she has a mild to moderate partial temporary disability I will see her back in a few months  some the symptoms may relate to the SI joint  will see how she reacts to the SI joint injection x-rays today of the hip showed very little arthritic contribution

## 2022-09-24 NOTE — HISTORY OF PRESENT ILLNESS
[de-identified] :  57 years of age initially hurt in a work accident 04/10/2014 she was in a chair getting out of the chair it broke she fell injuring her neck back right shoulder right knee right ankle and right hip she  was complaining at the time of left hip pain which was eventually  was excepted as part of the work accident she has been complaining lately of pain in her back saw pain management doctor Pam juarez and RFA procedure few weeks ago which helped greatly is still having pain in the back of the left hip\par  she had seen Dr. Alejandre 06/04/2020 who found mild degenerative changes in the hip at that  time suggested therapy and found some knee arthritis mild moderate

## 2022-09-24 NOTE — IMAGING
[de-identified] :  mild limits in motion in the neck with spasm healed incision some spasm in the back limits in flexion extension right hip has good motion left hip some restriction and some weakness in hip flexion tenderness over the greater trochanter in the groin and posteriorly in the buttock region right knee and right ankle have good motion  she does have mild pain referred to the left  SI joint with bilateral manual compression of the pelvis

## 2022-10-03 ENCOUNTER — APPOINTMENT (OUTPATIENT)
Dept: PAIN MANAGEMENT | Facility: CLINIC | Age: 57
End: 2022-10-03
Payer: COMMERCIAL

## 2022-10-03 PROCEDURE — 99152Z: CUSTOM

## 2022-10-03 PROCEDURE — 93770 DETERMINATION VENOUS PRESS: CPT

## 2022-10-03 PROCEDURE — 27096 INJECT SACROILIAC JOINT: CPT | Mod: LT

## 2022-10-03 PROCEDURE — 93040 RHYTHM ECG WITH REPORT: CPT | Mod: 59

## 2022-10-03 NOTE — PROCEDURE
[FreeTextEntry1] : SACROILIAC JOINT INJECTION UNDER FLUOROSCOPY [FreeTextEntry3] : SACROILIAC JOINT INJECTION UNDER FLUOROSCOPY\par \par Preoperative Diagnosis:  Left SIJ Arthropathy\par Postoperative Diagnosis:\par Procedure:  Left SIJ Injection under Fluoroscopy\par Physician: Wong Orozco D.O.\par \par Anesthesia: See nurses note/Cold spray./ IV sedation/ Versed- 4mg\par \par Medical Necessity:  Failure of conservative management.\par \par Indications:  The patient was admitted for a median branch injection for diagnostic purposes.  The patient was explained the technique, complications and rationale for the procedure and elected to proceed.\par \par Indication for Fluoroscopy:  This procedure requires the precise placement of the spinal needle.  It is the only way to accurately and safely perform the injection.\par \par CONSENT: The possible complications including infection, bleeding, nerve damage, Hospital admission, death or failure of the procedure; though unusual, are theoretically possible. The patient was educated about the of the procedure and alternative therapies. All questions were answered and the patient freely gave consent to proceed.\par \par Monitoring:  Patient had continuous blood pressure, EKG, and pulse oximetry throughout the case. See nurse's notes\par \par PROCEDURE NOTE:\par After obtaining written consent, the patient was placed on the fluoroscopic table in the prone position  A time out was performed.  Fluoroscopic guidance was used to isolate and identify the Right/Left sacroiliac joint.  A medial to lateral oblique projection allowed separation of the anterior (lateral) and posterior (medial) joint space.  The sacrum and posterior iliac crest was prepped with Betadine and draped in usual sterile fashion. Cold spray was used to localize the area. A 22-gauge 3.5 inch spinal needle was directed into the inferior aspect of the sacroiliac joint using a posterior approach in bull's eye fashion. The interosseous ligament was engaged which provoked responses consisting of her typical painful symptoms. A 2 cc total volume of lidocaine 2% ( 1 ½ cc) and Methylprednisolone 40mg (1/2 cc) was injected. Volumes were kept small-commensurate with the joint's capacity as determined by end-injection back pressure on the syringe. The needle was removed.\par \par Sacro-iliac Joint Radiography:\par Omnipaque 240mg/cc - 1/2 cc was injected in the inferior pole of the SIJ and the entire sacroiliac joint was outlined under fluoroscopy.\par \par \par \par Complications: None. The patient tolerated the procedure well.\par \par Disposition: I have examined the patient and there are no new physical findings since the original presentation.  Sensory and motor function were intact. The patient met discharge criteria see nurses notes. The discharge instruction sheet was reviewed and given to the patient. The patient was discharged home with a . If patient gets sustained relief will have patient do modified planks 3 times a day on carpet or yoga mat starting at 5 seconds building up to 1 minute without grimacing/Valsalva and walking.\par \par If patient gets sustained relief will have patient do modified planks 3 times a day on carpet or yoga mat starting at 5 seconds building up to 1 minute without grimacing/Valsalva and walking.\par \par Comment: If 70% relief greater than 2 hours or 50% greater than 24 hours would proceed to RFA. If 50% less than 24 hours would repeat to confirm for RFA. Follow in office. Call if any problems.\par \par Wong Orozco D.O.\par Diplomat, American Board of Anesthesiology\par Diplomat, American Board of Pain Medicine\par Diplomat, American Board of Pain Management\par \par \par

## 2022-10-10 ENCOUNTER — APPOINTMENT (OUTPATIENT)
Dept: PAIN MANAGEMENT | Facility: CLINIC | Age: 57
End: 2022-10-10

## 2022-10-13 ENCOUNTER — APPOINTMENT (OUTPATIENT)
Dept: PAIN MANAGEMENT | Facility: CLINIC | Age: 57
End: 2022-10-13

## 2022-10-13 VITALS — WEIGHT: 224 LBS | HEIGHT: 65 IN | BODY MASS INDEX: 37.32 KG/M2

## 2022-10-13 DIAGNOSIS — M47.816 SPONDYLOSIS W/OUT MYELOPATHY OR RADICULOPATHY, LUMBAR REGION: ICD-10-CM

## 2022-10-13 PROCEDURE — 99213 OFFICE O/P EST LOW 20 MIN: CPT

## 2022-10-16 NOTE — PHYSICAL EXAM
[de-identified] : GENERAL APPEARANCE OF PATIENT IS WELL DEVELOPED, WELL NOURISHED, BODY HABITUS NORMAL, WELL GROOMED, NO DEFORMITIES NOTED. \par Head - Atraumatic and Normocephalic \par Eyes, Nose, and Throat: External inspection of ears and nose are normal overall without scars, lesions, or masses noted. Assessment of hearing is normal\par Neck-Examination of neck shows no masses, overall appearance is normal, neck is symmetric, tracheal position is midline, no crepitus is noted. Examination of thyroid shows no enlargement, tenderness or masses\par Respiratory- Assessment of respiratory effort shows no intercostal retractions, no use of accessory muscles, unlabored breathing, and normal diaphragmatic movement.\par Cardiovascular- Examination of extremities show no edema or varicosities\par Musculoskeletal. Examination of gait is not antalgic and station is normal\par Inspection and palpation of digits and nails shows no clubbing, cyanosis, nodules, drainage, fluctuance, petechiae\par \par • Spine –   degrees in flexion. 5-10 degrees in extension with pain. Left PSIs tenderness and left Positive Salbador’s. Left greater trochanter tenderness. \par \par \par • Neck- inspection and palpation shows no misalignment, asymmetry, crepitation, defects, tenderness, masses, effusions. ROM is normal without crepitation or contracture. No instability or subluxation or laxity is noted. No abnormal movements.\par \par \par • RUE- inspection and palpation shows no misalignment, asymmetry, crepitation, defects, tenderness, masses, effusions. ROM is normal without crepitation or contracture. No instability or subluxation or laxity is noted. No abnormal movements.\par \par \par • LUE- inspection and palpation shows no misalignment, asymmetry, crepitation, defects, tenderness, masses, effusions. ROM is normal without crepitation or contracture. No instability or subluxation or laxity is noted. No abnormal movements.\par \par \par • RLE- inspection and palpation shows no misalignment, asymmetry, crepitation, defects, tenderness, masses, effusions. ROM is normal without crepitation or contracture. No instability or subluxation or laxity is noted. No abnormal movements.\par \par \par • LLE- inspection and palpation shows no misalignment, asymmetry, crepitation, defects, tenderness, masses, effusions. ROM is normal without crepitation or contracture. No instability or subluxation or laxity is noted. No abnormal movements.\par \par \par • Skin- Inspection of skin and subcutaneous tissue shows no rashes, lesions or ulcers. Palpation of skin and subcutaneous tissue shows no rashes, no indurations, subcutaneous nodules or tightening.\par \par \par • Abdomen- Nontender\par \par \par • Neurologic- CN 2-12 are grossly intact. No sensory or motor deficits in the upper and lower extremities. Adequate strength in upper and lower extremities \par \par \par • Psychiatric- Patient’s judgment and insight are intact. Oriented to time, place and person. Recent and remote memory intact.

## 2022-10-16 NOTE — ASSESSMENT
[FreeTextEntry1] : Ms. Lex Smith is a 57 year old woman who presents today status post left L3-S1 RFA w MAC with 75%. She currently complains of new onset of pain in the left SI Joint. She is status post left SI joint injection on 10/3/22 which provided her with about 75% relief for 2-3 days. At this time we will repeat this injection to confirm that is diagnostic of her pain. Patient is presenting with mainly buttock pain with impairment in ADLs and functionality pointing to the left sacroiliac joint.  The pain has not responded to conservative care, including medications, stretching, as well as active modalities, such as physical therapy.  Imaging studies as well as physical exam findings corroborate the symptomatology and point to the sacroiliac joints.  We will proceed with diagnostic and therapeutic left sacroiliac joint injection. If this injection does not offer patient enough relief, we will proceed with a left greater trochanter injection. \par \par The patient has severe anxiety of procedures that necessitates monitored anesthesia care (MAC). The procedure performed will be close to major nerves, arteries, and spinal cord and/or joint structures. Due to the proximity of these structures, we need the patient to be still during the procedure. With the help of MAC, this will be safely achieved and decrease the risk of any complications.\par \par Risk, benefits, pros and cons of procedure were explained to the patient using models and diagrams and their questions were answered.\par \par We encourage a home exercise program, stressing walking and strengthening of the core. We have recommended exercises such as the modified plank, Lucina exercise, elliptical , recumbent bike, as well as shoulder griddle strengthening. We discussed recreational activities such as swimming, Tal Chi and yoga. Use things that heat like hot shower or icy heat before rehab and exercising and at the beginning of the day, and ice (ice in a bag never directly on the skin) after activity and at the end of the day.\par \par I, Felicita Goode, attest that this documentation has been prepared under the direction and in the presence of Provider Wong Orozco, DO\par The documentation recorded by the scribe, in my presence, accurately reflects the service I personally performed, and the decisions made by me with my edits as appropriate.\par \par Best Regards, \par REGINE Rosa.[email or web address]. \par Diplomat, American Board of Anesthesiology \par Diplomat, American Board of Pain Medicine \par Diplomat, American Board of Pain Management

## 2022-10-16 NOTE — HISTORY OF PRESENT ILLNESS
[FreeTextEntry1] : HISTORY OF PRESENT ILLNESS: This is a 57 year old woman complaining of left hip, lower back and bilateral lower extremity pain. The patient has had this pain for 8 years. The pain has worsened in the last 3 days. The pain started after no specific injury or event. Patient describes pain as severe rating 10/10 on the pain scale. During the last month the pain has been constant with symptoms worsening in no typical pattern. Pain described as burning, sharp and shooting. Pain is associated with numbness and pins and needles into the lower extremities. Patient has weakness in the lower extremities. Pain is increased with standing, sitting and walking. Pain is decreased with lying down relaxation. Pain is not changed with coughing/sneezing in bowel movements. Bowel or bladder habits have not changed.\par \par TODAY: PATIENT PRESENTS FOR A REVISIT ENCOUNTER. She is status post left L3-S1 MBB on 6/2/22 which provided her 100% relief for 2 hours. We then proceeded with a left L3-S1 RFA w MAC on 7/26/22 which provided her 75% relief. Patient notes left Si joint injection on 10/3/22 which provided her with 75% relief for 2-3 days. At this time we will repeat a diagnostic joint injection w/ MAC.\par \par Today, she presents with new onset of pain in the left SI joint with PSIs tenderness with radiation into the left hip and left lower extremity. Denies numbness or tingling in the LLE.

## 2022-10-24 ENCOUNTER — APPOINTMENT (OUTPATIENT)
Dept: PAIN MANAGEMENT | Facility: CLINIC | Age: 57
End: 2022-10-24
Payer: COMMERCIAL

## 2022-10-24 PROCEDURE — 93040 RHYTHM ECG WITH REPORT: CPT | Mod: 79

## 2022-10-24 PROCEDURE — 27096 INJECT SACROILIAC JOINT: CPT | Mod: LT

## 2022-10-24 PROCEDURE — 93040 RHYTHM ECG WITH REPORT: CPT | Mod: 79,59

## 2022-10-24 PROCEDURE — 94761 N-INVAS EAR/PLS OXIMETRY MLT: CPT

## 2022-10-24 PROCEDURE — 94761 N-INVAS EAR/PLS OXIMETRY MLT: CPT | Mod: 59

## 2022-10-24 PROCEDURE — 93770 DETERMINATION VENOUS PRESS: CPT | Mod: 59

## 2022-10-24 NOTE — PROCEDURE
[FreeTextEntry1] : SACROILIAC JOINT INJECTION UNDER FLUOROSCOPY  [FreeTextEntry3] : SACROILIAC JOINT INJECTION UNDER FLUOROSCOPY\par \par Preoperative Diagnosis:  Left SIJ Arthropathy\par Postoperative Diagnosis:\par Procedure:  Left SIJ Injection under Fluoroscopy\par Physician: Wong Orozco D.O.\par Anesthesiologist/CRNA: Ms. Pierson\par Anesthesia: See nurses note/MAC/Cold spray. Versed 4mg, Fentanyl 50 mcg\par \par Medical Necessity:  Failure of conservative management.\par \par Indications:  The patient was admitted for a median branch injection for diagnostic purposes.  The patient was explained the technique, complications and rationale for the procedure and elected to proceed.\par \par Indication for Fluoroscopy:  This procedure requires the precise placement of the spinal needle.  It is the only way to accurately and safely perform the injection.\par \par CONSENT: The possible complications including infection, bleeding, nerve damage, Hospital admission, death or failure of the procedure; though unusual, are theoretically possible. The patient was educated about the of the procedure and alternative therapies. All questions were answered and the patient freely gave consent to proceed.\par \par Monitoring:  Patient had continuous blood pressure, EKG, and pulse oximetry throughout the case. See nurse's notes\par \par PROCEDURE NOTE:\par After obtaining written consent, the patient was placed on the fluoroscopic table in the prone position  A time out was performed.  Fluoroscopic guidance was used to isolate and identify the Right/Left sacroiliac joint.  A medial to lateral oblique projection allowed separation of the anterior (lateral) and posterior (medial) joint space.  The sacrum and posterior iliac crest was prepped with Betadine and draped in usual sterile fashion. Cold spray was used to localize the area. A 22-gauge 3.5 inch spinal needle was directed into the inferior aspect of the sacroiliac joint using a posterior approach in bull's eye fashion. The interosseous ligament was engaged which provoked responses consisting of her typical painful symptoms. A 2 cc total volume of lidocaine 2% ( 1 ½ cc) and Methylprednisolone 40mg (1/2 cc) was injected. Volumes were kept small-commensurate with the joint's capacity as determined by end-injection back pressure on the syringe. The needle was removed.\par \par Sacro-iliac Joint Radiography:\par Omnipaque 240mg/cc - 1/2 cc was injected in the inferior pole of the SIJ and the entire sacroiliac joint was outlined under fluoroscopy.\par \par \par \par Complications: None. The patient tolerated the procedure well.\par \par Disposition: I have examined the patient and there are no new physical findings since the original presentation.  Sensory and motor function were intact. The patient met discharge criteria see nurses notes. The discharge instruction sheet was reviewed and given to the patient. The patient was discharged home with a . If patient gets sustained relief will have patient do modified planks 3 times a day on carpet or yoga mat starting at 5 seconds building up to 1 minute without grimacing/Valsalva and walking.\par \par If patient gets sustained relief will have patient do modified planks 3 times a day on carpet or yoga mat starting at 5 seconds building up to 1 minute without grimacing/Valsalva and walking.\par \par Comment: If 70% relief greater than 2 hours or 50% greater than 24 hours would proceed to RFA. If 50% less than 24 hours would repeat to confirm for RFA. Follow in office. Call if any problems.\par \par Wong Orozco D.O.\par Diplomat, American Board of Anesthesiology\par Diplomat, American Board of Pain Medicine\par Diplomat, American Board of Pain Management\par \par

## 2022-10-27 ENCOUNTER — NON-APPOINTMENT (OUTPATIENT)
Age: 57
End: 2022-10-27

## 2022-11-01 ENCOUNTER — APPOINTMENT (OUTPATIENT)
Dept: ORTHOPEDIC SURGERY | Facility: CLINIC | Age: 57
End: 2022-11-01

## 2022-11-07 ENCOUNTER — APPOINTMENT (OUTPATIENT)
Dept: PAIN MANAGEMENT | Facility: CLINIC | Age: 57
End: 2022-11-07

## 2022-12-12 ENCOUNTER — APPOINTMENT (OUTPATIENT)
Dept: ORTHOPEDIC SURGERY | Facility: CLINIC | Age: 57
End: 2022-12-12

## 2022-12-19 ENCOUNTER — APPOINTMENT (OUTPATIENT)
Dept: OBGYN | Facility: CLINIC | Age: 57
End: 2022-12-19

## 2022-12-19 ENCOUNTER — OUTPATIENT (OUTPATIENT)
Dept: OUTPATIENT SERVICES | Facility: HOSPITAL | Age: 57
LOS: 1 days | Discharge: HOME | End: 2022-12-19

## 2022-12-19 VITALS — WEIGHT: 223 LBS | SYSTOLIC BLOOD PRESSURE: 124 MMHG | DIASTOLIC BLOOD PRESSURE: 85 MMHG | BODY MASS INDEX: 37.11 KG/M2

## 2022-12-19 DIAGNOSIS — Z01.419 ENCOUNTER FOR GYNECOLOGICAL EXAMINATION (GENERAL) (ROUTINE) WITHOUT ABNORMAL FINDINGS: ICD-10-CM

## 2022-12-19 PROCEDURE — 99396 PREV VISIT EST AGE 40-64: CPT

## 2022-12-19 NOTE — PLAN
[FreeTextEntry1] : Normal Annual\par Pap/hpv done\par vaginitis panel\par Mammo ordered\par STD testing ( cheating - she got )\par f/u 1 year,

## 2022-12-22 LAB
CYTOLOGY CVX/VAG DOC THIN PREP: NORMAL
HBV SURFACE AB SER QL: REACTIVE
HBV SURFACE AG SER QL: NONREACTIVE
HCV AB SER QL: NONREACTIVE
HCV S/CO RATIO: 0.19 S/CO
HIV1+2 AB SPEC QL IA.RAPID: NONREACTIVE
HPV HIGH+LOW RISK DNA PNL CVX: NOT DETECTED
T PALLIDUM AB SER QL IA: NEGATIVE

## 2023-04-16 NOTE — HISTORY OF PRESENT ILLNESS
CLOSING NOTES;

-Pt is asleep. No s/s acute distress noted. Pt's condition stable. Pt denies n/v and pain 
entire shift. All safety measures in place. Bed alarmed, side rails x3. Call light within 
reach. Will endorse to next nurse to cont care. [Patient reported mammogram was normal] : Patient reported mammogram was normal [Patient reported PAP Smear was normal] : Patient reported PAP Smear was normal [TextBox_4] : Here for annual. No Complaints.\par  [Mammogramdate] : 2021 [PapSmeardate] : 2020

## 2023-05-04 ENCOUNTER — APPOINTMENT (OUTPATIENT)
Dept: ORTHOPEDIC SURGERY | Facility: CLINIC | Age: 58
End: 2023-05-04
Payer: COMMERCIAL

## 2023-05-04 DIAGNOSIS — M25.552 PAIN IN LEFT HIP: ICD-10-CM

## 2023-05-04 DIAGNOSIS — M47.817 SPONDYLOSIS W/OUT MYELOPATHY OR RADICULOPATHY, LUMBOSACRAL REGION: ICD-10-CM

## 2023-05-04 DIAGNOSIS — M16.12 UNILATERAL PRIMARY OSTEOARTHRITIS, LEFT HIP: ICD-10-CM

## 2023-05-04 DIAGNOSIS — M47.818 SPONDYLOSIS W/OUT MYELOPATHY OR RADICULOPATHY, SACRAL AND SACROCOCCYGEAL REGION: ICD-10-CM

## 2023-05-04 PROCEDURE — 99213 OFFICE O/P EST LOW 20 MIN: CPT

## 2023-05-04 NOTE — ASSESSMENT
[FreeTextEntry1] :  at this time i  would like to  get a new MRI of the lumbar spine and left hip show continue with pain management I will speak with Dr. chavarria she continues to work she has a  moderate partial temporary disability I will see her back in a few months  some the symptoms may relate to the SI joint  will see how she reacts to the SI joint injection x-rays  of the hip showed very little arthritic contribution

## 2023-05-04 NOTE — REASON FOR VISIT
[FreeTextEntry2] : work injury of 4/10/14 to her lower back left hip and left knee pain\par  still working using a back brace seen pain management doctor priscila  on medical marijuana  still difficult to stand or walk and down stairs pain injections have not helped did see [facility] a few years ago\par  reports getting a left hip injection recently

## 2023-05-04 NOTE — IMAGING
[de-identified] :  mild limits in motion in the neck with spasm healed incision some spasm in the back limits in flexion extension\par  right hip has good motion left hip some restriction and some weakness in hip flexion tenderness over the greater trochanter in the groin and posteriorly in the buttock region right knee and right ankle have good motion  she does have mild pain referred to the left  SI joint with bilateral manual compression of the pelvis

## 2023-05-04 NOTE — HISTORY OF PRESENT ILLNESS
[de-identified] :  57 years of age initially hurt in a work accident 04/10/2014 she was in a chair getting out of the chair it broke she fell injuring her neck back right shoulder right knee right ankle and right hip she  was complaining at the time of left hip pain which was eventually  was excepted as part of the work accident she has been complaining lately of pain in her back saw pain management doctor Pam juarez and RFA procedure few weeks ago which helped greatly is still having pain in the back of the left hip\par  she had seen Dr. Alejandre 06/04/2020 who found mild degenerative changes in the hip at that  time suggested therapy and found some knee arthritis mild moderate

## 2023-08-01 ENCOUNTER — APPOINTMENT (OUTPATIENT)
Dept: ORTHOPEDIC SURGERY | Facility: CLINIC | Age: 58
End: 2023-08-01

## 2023-10-24 ENCOUNTER — APPOINTMENT (OUTPATIENT)
Dept: ORTHOPEDIC SURGERY | Facility: CLINIC | Age: 58
End: 2023-10-24
Payer: OTHER MISCELLANEOUS

## 2023-10-24 PROCEDURE — 99213 OFFICE O/P EST LOW 20 MIN: CPT

## 2024-01-08 ENCOUNTER — LABORATORY RESULT (OUTPATIENT)
Age: 59
End: 2024-01-08

## 2024-01-08 ENCOUNTER — APPOINTMENT (OUTPATIENT)
Dept: OBGYN | Facility: CLINIC | Age: 59
End: 2024-01-08

## 2024-01-08 ENCOUNTER — APPOINTMENT (OUTPATIENT)
Dept: OBGYN | Facility: CLINIC | Age: 59
End: 2024-01-08
Payer: COMMERCIAL

## 2024-01-08 ENCOUNTER — OUTPATIENT (OUTPATIENT)
Dept: OUTPATIENT SERVICES | Facility: HOSPITAL | Age: 59
LOS: 1 days | End: 2024-01-08
Payer: COMMERCIAL

## 2024-01-08 VITALS
SYSTOLIC BLOOD PRESSURE: 93 MMHG | BODY MASS INDEX: 36.32 KG/M2 | HEIGHT: 65 IN | WEIGHT: 218 LBS | DIASTOLIC BLOOD PRESSURE: 66 MMHG

## 2024-01-08 DIAGNOSIS — Z00.00 ENCOUNTER FOR GENERAL ADULT MEDICAL EXAMINATION WITHOUT ABNORMAL FINDINGS: ICD-10-CM

## 2024-01-08 DIAGNOSIS — D17.23 BENIGN LIPOMATOUS NEOPLASM OF SKIN AND SUBCUTANEOUS TISSUE OF RIGHT LEG: ICD-10-CM

## 2024-01-08 PROCEDURE — 99213 OFFICE O/P EST LOW 20 MIN: CPT

## 2024-01-08 PROCEDURE — 87481 CANDIDA DNA AMP PROBE: CPT

## 2024-01-08 PROCEDURE — 87491 CHLMYD TRACH DNA AMP PROBE: CPT

## 2024-01-08 PROCEDURE — 81513 NFCT DS BV RNA VAG FLU ALG: CPT

## 2024-01-08 PROCEDURE — 87591 N.GONORRHOEAE DNA AMP PROB: CPT

## 2024-01-08 PROCEDURE — 87661 TRICHOMONAS VAGINALIS AMPLIF: CPT

## 2024-01-08 PROCEDURE — 99396 PREV VISIT EST AGE 40-64: CPT

## 2024-01-09 DIAGNOSIS — D17.23 BENIGN LIPOMATOUS NEOPLASM OF SKIN AND SUBCUTANEOUS TISSUE OF RIGHT LEG: ICD-10-CM

## 2024-01-09 DIAGNOSIS — Z01.419 ENCOUNTER FOR GYNECOLOGICAL EXAMINATION (GENERAL) (ROUTINE) WITHOUT ABNORMAL FINDINGS: ICD-10-CM

## 2024-01-09 NOTE — PLAN
[FreeTextEntry1] : Normal Exam Mildly enlarged uterus - TV/TA sono Mammo ordered Vag panel  Referral to surgery for possible right leg lipoma.

## 2024-01-09 NOTE — PHYSICAL EXAM
[Chaperone Present] : A chaperone was present in the examining room during all aspects of the physical examination [Appropriately responsive] : appropriately responsive [Alert] : alert [No Acute Distress] : no acute distress [Soft] : soft [Non-tender] : non-tender [Non-distended] : non-distended [No HSM] : No HSM [No Lesions] : no lesions [No Mass] : no mass [Oriented x3] : oriented x3 [Examination Of The Breasts] : a normal appearance [No Masses] : no breast masses were palpable [Labia Majora] : normal [Labia Minora] : normal [Discharge] : a  ~M vaginal discharge was present [Scant] : scant [White] : white [Normal] : normal [Enlarged ___ wks] : enlarged [unfilled] ~Uweeks [Uterine Adnexae] : normal

## 2024-01-09 NOTE — HISTORY OF PRESENT ILLNESS
[FreeTextEntry1] : PAtient here for annual. C/O mild vaginal discharge. NO odor. No itching. She also c/o lump under her right knee.  [Patient reported PAP Smear was normal] : Patient reported PAP Smear was normal [Mammogramdate] : 2021 [PapSmeardate] : 2022

## 2024-01-10 ENCOUNTER — APPOINTMENT (OUTPATIENT)
Dept: ORTHOPEDIC SURGERY | Facility: CLINIC | Age: 59
End: 2024-01-10
Payer: OTHER MISCELLANEOUS

## 2024-01-10 ENCOUNTER — NON-APPOINTMENT (OUTPATIENT)
Age: 59
End: 2024-01-10

## 2024-01-10 DIAGNOSIS — M17.12 UNILATERAL PRIMARY OSTEOARTHRITIS, LEFT KNEE: ICD-10-CM

## 2024-01-10 DIAGNOSIS — S83.232A COMPLEX TEAR OF MEDIAL MENISCUS, CURRENT INJURY, LEFT KNEE, INITIAL ENCOUNTER: ICD-10-CM

## 2024-01-10 DIAGNOSIS — S83.272A COMPLEX TEAR OF LATERAL MENISCUS, CURRENT INJURY, LEFT KNEE, INITIAL ENCOUNTER: ICD-10-CM

## 2024-01-10 DIAGNOSIS — S43.402D UNSPECIFIED SPRAIN OF LEFT SHOULDER JOINT, SUBSEQUENT ENCOUNTER: ICD-10-CM

## 2024-01-10 DIAGNOSIS — S53.402D UNSPECIFIED SPRAIN OF LEFT ELBOW, SUBSEQUENT ENCOUNTER: ICD-10-CM

## 2024-01-10 PROCEDURE — 99455 WORK RELATED DISABILITY EXAM: CPT

## 2024-01-10 NOTE — IMAGING
[de-identified] : Left shoulder mild impingement  4+/5  strength positive Lucio sign elevation   135       ER/IR    75/55 right shoulder     elevation 180    ER/IR  90/70  left elbow full motion no pain or tenderness good strength  left knee tenderness on the joint medial and laterally some crepitus swelling limits in flexion antalgic gait ROM   full extension      115 degrees flexion right knee   full extension       140 degrees flexion

## 2024-01-10 NOTE — HISTORY OF PRESENT ILLNESS
[de-identified] : \par  57-year-old female comes in today for follow-up evaluation of her left arm and left knee pain from an injury that\par  occurred on April 24, 2021. Patient had an injury that occurred at work. patient works for [employer].\par  Patient slipped on the stairs. Of note patient did have covid vaccine left arm in April, she saw her doctor since she was\par  having some swelling in the axilla region which they believe may have been some swollen lymph nodes. She had a chest\par  x-ray otherwise no imaging done of her arm. Patient has been working. Patient is right-hand dominant. Patient has an\par  allergy to NSAIDs so she takes Tylenol. She has been working using a TENS unit ordered herself no problems in the arm\par  prior just started therapy 4 visits then was stopped feeling some weakness hip flexion ankle dorsiflexion \par  History cervical spine surgery 2014 did get MRI of the left knee 11/25/2021\par  did get MRI left shoulder 02/05/2022 there was some death in the family did not get therapy yet describe asciatic\par  complaint left more than right still working

## 2024-01-10 NOTE — ASSESSMENT
[FreeTextEntry1] :   Patient is now approaching 3 years from her work accident she has had conservative treatment for the problem she struggles still with pain and restricted motion I do believe that the injuries are now amenable for schedule loss use by Worker's Compensation guidelines therefore the injury to the left shoulder has a 30  % SLU and the injury to the left knee has a 15  % SLU All measurements were taken 3 times with the use of a goniometer

## 2024-01-10 NOTE — REASON FOR VISIT
[FreeTextEntry2] : work injury of 3/24/21 to her left shoulder and left knee Still has pain in the left forearm reports some cramping in the hands

## 2024-02-05 ENCOUNTER — ASOB RESULT (OUTPATIENT)
Age: 59
End: 2024-02-05

## 2024-02-05 ENCOUNTER — OUTPATIENT (OUTPATIENT)
Dept: OUTPATIENT SERVICES | Facility: HOSPITAL | Age: 59
LOS: 1 days | End: 2024-02-05
Payer: COMMERCIAL

## 2024-02-05 ENCOUNTER — APPOINTMENT (OUTPATIENT)
Dept: ANTEPARTUM | Facility: CLINIC | Age: 59
End: 2024-02-05
Payer: COMMERCIAL

## 2024-02-05 ENCOUNTER — APPOINTMENT (OUTPATIENT)
Dept: ORTHOPEDIC SURGERY | Facility: CLINIC | Age: 59
End: 2024-02-05

## 2024-02-05 DIAGNOSIS — Z00.00 ENCOUNTER FOR GENERAL ADULT MEDICAL EXAMINATION WITHOUT ABNORMAL FINDINGS: ICD-10-CM

## 2024-02-05 PROCEDURE — 76856 US EXAM PELVIC COMPLETE: CPT | Mod: 26,59

## 2024-02-05 PROCEDURE — 76830 TRANSVAGINAL US NON-OB: CPT

## 2024-02-05 PROCEDURE — 76856 US EXAM PELVIC COMPLETE: CPT

## 2024-02-05 PROCEDURE — 76830 TRANSVAGINAL US NON-OB: CPT | Mod: 26

## 2024-02-06 DIAGNOSIS — D25.2 SUBSEROSAL LEIOMYOMA OF UTERUS: ICD-10-CM

## 2024-02-06 DIAGNOSIS — D25.1 INTRAMURAL LEIOMYOMA OF UTERUS: ICD-10-CM

## 2024-02-06 DIAGNOSIS — N85.4 MALPOSITION OF UTERUS: ICD-10-CM

## 2024-02-06 DIAGNOSIS — N83.209 UNSPECIFIED OVARIAN CYST, UNSPECIFIED SIDE: ICD-10-CM

## 2024-03-20 ENCOUNTER — APPOINTMENT (OUTPATIENT)
Dept: ORTHOPEDIC SURGERY | Facility: CLINIC | Age: 59
End: 2024-03-20

## 2024-06-24 ENCOUNTER — APPOINTMENT (OUTPATIENT)
Dept: OBGYN | Facility: CLINIC | Age: 59
End: 2024-06-24
Payer: COMMERCIAL

## 2024-06-24 ENCOUNTER — OUTPATIENT (OUTPATIENT)
Dept: OUTPATIENT SERVICES | Facility: HOSPITAL | Age: 59
LOS: 1 days | End: 2024-06-24
Payer: COMMERCIAL

## 2024-06-24 VITALS — DIASTOLIC BLOOD PRESSURE: 66 MMHG | SYSTOLIC BLOOD PRESSURE: 122 MMHG

## 2024-06-24 VITALS — HEIGHT: 65 IN | WEIGHT: 220 LBS | BODY MASS INDEX: 36.65 KG/M2

## 2024-06-24 DIAGNOSIS — Z71.2 PERSON CONSULTING FOR EXPLANATION OF EXAMINATION OR TEST FINDINGS: ICD-10-CM

## 2024-06-24 DIAGNOSIS — Z01.419 ENCOUNTER FOR GYNECOLOGICAL EXAMINATION (GENERAL) (ROUTINE) W/OUT ABNORMAL FINDINGS: ICD-10-CM

## 2024-06-24 DIAGNOSIS — D21.9 BENIGN NEOPLASM OF CONNECTIVE AND OTHER SOFT TISSUE, UNSPECIFIED: ICD-10-CM

## 2024-06-24 PROCEDURE — 99213 OFFICE O/P EST LOW 20 MIN: CPT

## 2024-06-26 DIAGNOSIS — D21.9 BENIGN NEOPLASM OF CONNECTIVE AND OTHER SOFT TISSUE, UNSPECIFIED: ICD-10-CM

## 2025-08-30 ENCOUNTER — OUTPATIENT (OUTPATIENT)
Dept: OUTPATIENT SERVICES | Facility: HOSPITAL | Age: 60
LOS: 1 days | End: 2025-08-30
Payer: COMMERCIAL

## 2025-08-30 DIAGNOSIS — R92.2 INCONCLUSIVE MAMMOGRAM: ICD-10-CM

## 2025-08-30 DIAGNOSIS — Z12.31 ENCOUNTER FOR SCREENING MAMMOGRAM FOR MALIGNANT NEOPLASM OF BREAST: ICD-10-CM

## 2025-08-30 PROCEDURE — 77067 SCR MAMMO BI INCL CAD: CPT

## 2025-08-30 PROCEDURE — 76641 ULTRASOUND BREAST COMPLETE: CPT | Mod: 26,50

## 2025-08-30 PROCEDURE — 77063 BREAST TOMOSYNTHESIS BI: CPT | Mod: 26

## 2025-08-30 PROCEDURE — 77063 BREAST TOMOSYNTHESIS BI: CPT

## 2025-08-30 PROCEDURE — 77067 SCR MAMMO BI INCL CAD: CPT | Mod: 26

## 2025-08-30 PROCEDURE — 76641 ULTRASOUND BREAST COMPLETE: CPT | Mod: 50

## 2025-08-31 DIAGNOSIS — R92.2 INCONCLUSIVE MAMMOGRAM: ICD-10-CM

## 2025-08-31 DIAGNOSIS — Z12.31 ENCOUNTER FOR SCREENING MAMMOGRAM FOR MALIGNANT NEOPLASM OF BREAST: ICD-10-CM
